# Patient Record
(demographics unavailable — no encounter records)

---

## 2024-10-21 NOTE — PHYSICAL EXAM
[General Appearance - Alert] : alert [General Appearance - In No Acute Distress] : in no acute distress [General Appearance - Well Nourished] : well nourished [de-identified] : No pain on palpation of feet b/l, no pain on ROM of foot and ankle b/l, no structural deformities noted b/l. [FreeTextEntry1] : Protective sensation intact WNL b/l, light touch intact WNL b/l, Achilles tendon reflex intact b/l.

## 2024-10-21 NOTE — ASSESSMENT
[FreeTextEntry1] : Examination. Discussed with patient and educated patient and her mother on paronychia, and treatment. Discussed partial nail avulsion woth phenol and alcohol chemical matrixectomy, including risks, benefits and alternatives, and patient and her mother demonstrated verbal understanding and consent and the patient's mother signed informed consent with witness present. Aseptic preparation of the right hallux with alcohol. Preparation of 1.5 mL of 2% lidocaine plain mixed with 1.5 mL of 0.5% marcaine plain. Aseptic preparation of the right hallux with betadine. Partial nail avulsion of right hallux lateral border performed with sterile instrumentation, and avulsed ingrown nail border, right hallux nail lateral border, sent to lab for nail pathology, followed by application of phenol x 3 for 30 sec to right hallux lateral nail border followed by alcohol application in the typical manner. Applied neosporin cream and sterile bandage to the hallux. Instructed patient to keep dressing clean, dry and intact for 24 hours. Instructed patient to then soak area in warm water for 10 min, 3 times a day, then dry and apply antibiotic cream and a bandaid. Instructed patient to call our office if any signs or symptoms of infection arise. Patient and her mother demonstrated verbal understanding of all instructions. Patient to return to office in 1 week.

## 2024-10-21 NOTE — REVIEW OF SYSTEMS
[Negative] : Musculoskeletal [de-identified] : right big toe nail swollen and painful, ingrown toe nail

## 2024-10-21 NOTE — HISTORY OF PRESENT ILLNESS
[FreeTextEntry1] : 17 year old female patient presents to office today with her mother for complaint of painful, recurring ingrown to nail to the right big toe. Patient states this has been recurring even after removal more and more frequently over the past year. She has started trying to cut the toe nail often and remove when they are ingrown herself but it keeps going back. She states she goes to school for dance so she is dancing often and thinks that may be contributing but also the constant ingrown nails is affecting her being able to participate.  Patient denies nausea, vomiting, chills, fever. She denies drainage from the ingrown toe nail.

## 2024-10-28 NOTE — PHYSICAL EXAM
[General Appearance - Alert] : alert [General Appearance - In No Acute Distress] : in no acute distress [General Appearance - Well Nourished] : well nourished [de-identified] : No pain on palpation of feet b/l, no pain on ROM of foot and ankle b/l. No structural deformities noted b/l. [FreeTextEntry1] : Protective sensation intact WNL b/l, light touch intact WNL b/l, Achilles tendon reflex intact b/l.

## 2024-10-28 NOTE — REASON FOR VISIT
[Follow-Up Visit] : a follow-up visit for [FreeTextEntry2] : right hallux recurring ingrown toe nail

## 2024-10-28 NOTE — ASSESSMENT
[FreeTextEntry1] : Exam. Applied betadine and mechanically cleansed area, then applied neosporin cream and a dry, sterile bandage. Instructed the patient to soak area in warm water for 10 min, 2 times a day, then dry and apply antibiotic cream and a bandaid. Instructed the patient to call our office if any signs or symptoms of infection arise. Patient and her mother demonstrated verbal understanding of all instructions. Patient to return to our office in 2 weeks.
(1) Other Diagnosis

## 2024-10-28 NOTE — REVIEW OF SYSTEMS
[Negative] : Musculoskeletal [de-identified] : right big toe nail swollen and painful, ingrown toe nail

## 2024-10-28 NOTE — HISTORY OF PRESENT ILLNESS
[FreeTextEntry1] : This 17 year old female patient returns to office with her mother for follow up care of complaint of painful, recurring ingrown to nail to the right big toe, 1 week s/p P&A to the lateral nail border. Patient states this has been recurring even after removal more and more frequently over the past year. Patient states she has been applying topical antibiotic crem and a bandaid as instructed except today she aired it out, not wearing a bandaid. Patient states she has not been soaking 3 times a day as instructed, but has been soaking at least once a day. Patient states the area hs not been painful. She denies nausea, vomiting, chills, fever.

## 2025-05-05 NOTE — HISTORY OF PRESENT ILLNESS
[FreeTextEntry1] : 17 year old female patient presents to office with her father for complaint of left big toe nail ingrown toe nail. She states it started hurting 2 days ago and it is very painful whenever she walks. Patient states she thought she saw drainage the other day but there has been no more drainage.  Patient denies nausea, vomiting, chills, fever.

## 2025-05-05 NOTE — PHYSICAL EXAM
[General Appearance - Alert] : alert [General Appearance - In No Acute Distress] : in no acute distress [General Appearance - Well Nourished] : well nourished [de-identified] : No structural deformities noted b/l, no pain on palpation of feet b/l, no pain on ROM of foot and ankle b/l. [FreeTextEntry1] : Protective sensation intact WNL b/l, light touch intact WNL b/l, Achilles tendon reflex intact b/l.

## 2025-05-05 NOTE — PHYSICAL EXAM
[General Appearance - Alert] : alert [General Appearance - In No Acute Distress] : in no acute distress [General Appearance - Well Nourished] : well nourished [de-identified] : No structural deformities noted b/l, no pain on palpation of feet b/l, no pain on ROM of foot and ankle b/l. [FreeTextEntry1] : Protective sensation intact WNL b/l, light touch intact WNL b/l, Achilles tendon reflex intact b/l.

## 2025-05-05 NOTE — ASSESSMENT
[FreeTextEntry1] : Examination. Discussed ingrown toe nails- condition, treatment and prevention at length. Discussed wedge excision of the affected ingrown toe nail fold, left hallux lateral, to remove the ingrowing nail and prevention possible infection, discussed risks benefits and alternatives and patient gave verbal consent. Aseptic preparation of the left hallux with betadine. The area was anesthetized with ethyl chloride and slant back of the affected ingrown nail border with wedge excision of the affected ingrown nail border and fold - left lateral- was performed with sterile instrumentation of sterile tissue nippers, with no purulence expressed from the nail fold but serosanguinous drainage expressed, and pain relief was noted by the patient. Cleansed the area with betadine, then applied neosporin cream and sterile bandage to the hallux. Instructed the patient to soak the area in warm water for 10 min, prn, and apply antibiotic cream and bandaid to the area. Instructed patient to call our office if any signs or symptoms of infection arise. Patient and her father demonstrated verbal understanding of all instructions. Patient to return to office prn.